# Patient Record
Sex: FEMALE | Race: WHITE | Employment: FULL TIME | URBAN - METROPOLITAN AREA
[De-identification: names, ages, dates, MRNs, and addresses within clinical notes are randomized per-mention and may not be internally consistent; named-entity substitution may affect disease eponyms.]

---

## 2017-05-26 ENCOUNTER — OFFICE VISIT (OUTPATIENT)
Dept: OBGYN CLINIC | Facility: CLINIC | Age: 37
End: 2017-05-26

## 2017-05-26 VITALS
SYSTOLIC BLOOD PRESSURE: 106 MMHG | HEIGHT: 67 IN | BODY MASS INDEX: 20.56 KG/M2 | WEIGHT: 131 LBS | HEART RATE: 69 BPM | DIASTOLIC BLOOD PRESSURE: 69 MMHG

## 2017-05-26 DIAGNOSIS — N94.810 VULVAR VESTIBULITIS: Primary | ICD-10-CM

## 2017-05-26 PROCEDURE — 99203 OFFICE O/P NEW LOW 30 MIN: CPT | Performed by: OBSTETRICS & GYNECOLOGY

## 2017-05-26 RX ORDER — CLOBETASOL PROPIONATE 0.5 MG/G
1 OINTMENT TOPICAL 2 TIMES DAILY
Qty: 30 G | Refills: 1 | Status: SHIPPED | OUTPATIENT
Start: 2017-05-26

## 2017-05-26 NOTE — PATIENT INSTRUCTIONS
Guidelines for Vulvar Skin Care    NOTE: The goal is to promote healthy vulvar skin. This is done by decreasing and removing chemicals, moisture, or rubbing (friction).  Products listed below have been suggested for use because of their past success in help \"mild\". Dove for Sensitive Skin, Neutrogena, Basis,    Aveeno, or Pears are the soaps we suggest. Do not use soap directly on the vulvar skin. Just warm water and your hand will keep the vulvar area clean without irritating the skin.   2. Do These products also help to decrease skin irritation during your period and when you urinate. If wearing wool causes you to itch, do not use A&D ointment, as it contains lanolin. 11. DO NOT DOUCHE.  Baking soda soaks or rinsing with warm water will help ri

## 2017-05-26 NOTE — PROGRESS NOTES
HPI:    Patient ID: Amira Raymundo is a 42year old female. HPI Nulligravida referred by friend of a friend who is a patient. She has menses q 28d / 4d / normal flow and no significant cramps. Condoms for BC.  She is c/o burning on external genitalia ASSESSMENT/PLAN:   Vulvar vestibulitis  (primary encounter diagnosis)  We discussed trial of clobetasol for 2-3 weeks lightly BID. Though no significant DC, we sent culture due to chronicity of problem.      Orders Placed This Encounter  Vaginal Culture

## 2017-06-06 ENCOUNTER — TELEPHONE (OUTPATIENT)
Dept: OBGYN CLINIC | Facility: CLINIC | Age: 37
End: 2017-06-06

## 2017-06-06 NOTE — TELEPHONE ENCOUNTER
Pt states that she used clobetasol for a few days but stopped it last night. Pt states that she feels like the \"skin is shrinking\" and that area has turned purple and very painful.  Pt states that the reaction is only where she used the cream, does not ha

## 2017-06-06 NOTE — TELEPHONE ENCOUNTER
Per the pt she was prescribed a medicated cream, and is having an allergic reaction to it. Please advise.

## 2017-06-08 NOTE — TELEPHONE ENCOUNTER
Pt informed message sent to [facility].  Pt states that she has stopped using the medication Clobetasol. Pt states that the painful feeling from using the medication has stopped. Pt states that the purple area from using the medication has started to fade.   Pt

## 2017-06-09 NOTE — TELEPHONE ENCOUNTER
The pt is calling back again, and is frustrated. The pt would like to speak with someone. Please advise.

## 2017-06-09 NOTE — TELEPHONE ENCOUNTER
Pt calling again requesting recs from [facility] for \"burning in and around the urethra\". Pt denies abnormal discharge. Pt also denies burning and pain with urination.  Pt informed KAVITA is out of the office at this time and nurse will try to get in contact with R

## 2017-06-12 NOTE — TELEPHONE ENCOUNTER
Pt states she has not heard back from clinic. Pt states the pain to the urethra over the weekend was 10/10. Pt reports her pain at 7/10 today. Pt advised to go to the ER for immediate evaluation of pain.  Pt states she does not have insurance so she will no

## 2017-06-13 NOTE — TELEPHONE ENCOUNTER
No answer and detailed message left concerning where we go from here. I understand she is currently uninsured but we are limited to what we can use. An topical anesthetic can have temporary relief of pain but doesn't help inflammation.   The steroid is stil

## 2017-06-16 ENCOUNTER — TELEPHONE (OUTPATIENT)
Dept: OBGYN CLINIC | Facility: CLINIC | Age: 37
End: 2017-06-16

## 2017-06-16 NOTE — TELEPHONE ENCOUNTER
Reviewed with [facility]AVITA in office who stated he will be calling pt later this afternoon. Pt informed and verbalized understanding.

## 2017-06-16 NOTE — TELEPHONE ENCOUNTER
PT CALLING BACK PER PT STATE SHE HAVE NOT GOTTEN A CALL BACK FROM DR WALLS YET / WANT TO MAKE SURE HE DO NOT LEAVE WITHOUT TALKING WITH HER / FOR THE WEEK-END / PLS ADV

## 2017-06-16 NOTE — TELEPHONE ENCOUNTER
PT IS STILL WAITING FOR THE DR TO CALL HER BACK ABOUT WHAT MEDICATION TO TAKE  , THE MEDICATION IS FOR BURNING AND PAIN ,

## 2017-06-24 NOTE — TELEPHONE ENCOUNTER
CALLED PT.  STATES SHE GOT THE MESSAGE LAST NIGHT THAT [facility] WAS GOING TO CALL THIS MORNING AND SHE IS STILL WAITING. SHE SAID SHE IS ONLY AVAILABLE TO TALK UNTIL 10:45AM AS SHE STARTS WORK THEN.   STATES SHE CAN BE REACHED AT [employer] ALL DAY MON AND T

## 2017-06-26 ENCOUNTER — TELEPHONE (OUTPATIENT)
Dept: OBGYN CLINIC | Facility: CLINIC | Age: 37
End: 2017-06-26

## 2017-06-27 NOTE — TELEPHONE ENCOUNTER
Shavon Currie and  Discussed her complaints tonight over the phone. I again reviewed her remote history with 5 months of symptoms several years ago followed by spontaneous resolution which may have been in part due to a course of Pyridium.  I did not have that part

## 2017-06-29 NOTE — TELEPHONE ENCOUNTER
PER DR. RAY'S OFFICE THE FIRST OPENING IS AT THE [place] OFFICE ON 7-5-17 AND NEXT OPENING AT THE [place] OFFICE IS 8-29-17.   SINCE PT IS SELF-PAY SHE WILL NEED TO GIVE $600 DEPOSIT, SIGN A WAIVER RE:RESPONSIBLITY FOR THE CHARGES TO BE PAID WITHIN

## 2020-12-02 ENCOUNTER — OFFICE VISIT (OUTPATIENT)
Dept: INTERNAL MEDICINE CLINIC | Facility: CLINIC | Age: 40
End: 2020-12-02

## 2020-12-02 VITALS
DIASTOLIC BLOOD PRESSURE: 78 MMHG | HEIGHT: 67 IN | HEART RATE: 58 BPM | WEIGHT: 141.38 LBS | SYSTOLIC BLOOD PRESSURE: 110 MMHG | OXYGEN SATURATION: 98 % | BODY MASS INDEX: 22.19 KG/M2

## 2020-12-02 DIAGNOSIS — R11.0 NAUSEA: ICD-10-CM

## 2020-12-02 DIAGNOSIS — R42 DIZZINESS: ICD-10-CM

## 2020-12-02 DIAGNOSIS — R42 VERTIGO: Primary | ICD-10-CM

## 2020-12-02 DIAGNOSIS — D22.9 MULTIPLE PIGMENTED NEVI: ICD-10-CM

## 2020-12-02 DIAGNOSIS — Z80.0 FAMILY HISTORY OF COLON CANCER: ICD-10-CM

## 2020-12-02 DIAGNOSIS — R30.0 BURNING WITH URINATION: ICD-10-CM

## 2020-12-02 PROCEDURE — 3008F BODY MASS INDEX DOCD: CPT | Performed by: INTERNAL MEDICINE

## 2020-12-02 PROCEDURE — 3074F SYST BP LT 130 MM HG: CPT | Performed by: INTERNAL MEDICINE

## 2020-12-02 PROCEDURE — 3078F DIAST BP <80 MM HG: CPT | Performed by: INTERNAL MEDICINE

## 2020-12-02 PROCEDURE — 99204 OFFICE O/P NEW MOD 45 MIN: CPT | Performed by: INTERNAL MEDICINE

## 2020-12-02 RX ORDER — MECLIZINE HYDROCHLORIDE 25 MG/1
25 TABLET ORAL 3 TIMES DAILY PRN
Qty: 30 TABLET | Refills: 0 | Status: SHIPPED | OUTPATIENT
Start: 2020-12-02

## 2020-12-02 NOTE — PROGRESS NOTES
Cynthia Huerta is a 41year old female.     Chief complaint: vertigo and nausea     HPI:     Cynthia Huerta is a 41year old female who presents for vertigo and nausea   Symptoms started 2 months   Got better then 3 weeks ago started happening again well developed, well nourished,in no apparent distress  SKIN: multiple pigmented skin nevi   HEENT: atraumatic, normocephalic  NECK: supple,no adenopathy  LUNGS: clear to auscultation  CARDIO: RRR without murmur  GI: good BS's,no masses, HSM or tenderness vertigo   Starting meclizine   referral to GI since fx of colon cancer   Will have blood work       Advised to schedule annual physical exam and follow up in 1 month   Please return to the clinic if you are having persistent or worsening symptoms   Lore Skinner

## 2020-12-08 ENCOUNTER — TELEPHONE (OUTPATIENT)
Dept: INTERNAL MEDICINE CLINIC | Facility: CLINIC | Age: 40
End: 2020-12-08

## 2020-12-08 NOTE — TELEPHONE ENCOUNTER
Patient checking on her lab results from [facility] that she had done last Thursday.

## 2020-12-11 ENCOUNTER — TELEPHONE (OUTPATIENT)
Dept: INTERNAL MEDICINE CLINIC | Facility: CLINIC | Age: 40
End: 2020-12-11

## 2020-12-11 NOTE — TELEPHONE ENCOUNTER
Spoke to patient     Called [facility] to get results     They had our old office info    Gave them our new address and numbers. Will fax results.

## 2020-12-11 NOTE — TELEPHONE ENCOUNTER
Pt was seen on December 2nd and is still waiting for someone to call her with her results from her labs.

## 2020-12-15 DIAGNOSIS — R10.9 ABDOMINAL PAIN, UNSPECIFIED ABDOMINAL LOCATION: Primary | ICD-10-CM

## (undated) NOTE — MR AVS SNAPSHOT
[place]  [address]  [phone]               Thank you for choosing us for your health care visit with Martín Dawn. DO Nithin.   We are glad to serve you and happy to provide you with this summ allows air in and moisture out. Do not wear underwear when sleeping at night. Loose fitting boxers or pajama bottoms are fine. 2. Avoid pantyhose.  If you must wear them, either cut out the lesly crotch (if you cut   out the crotch, be sure to leave abou urinating. Pat dry rather than wiping. 8. Do not use deodorized pads and tampons. Tampons may be used when the blood flow is heavy enough to soak one tampon in four hours or less.  Tampons are safe for most women, but wearing them too long or when the bloo these oils will rinse away with water and will not increase your chances of infection. Water-based lubricants tend to dry out before intercourse is over and may also contain chemicals that can irritate your vulvar skin.  It may be helpful to use a non-lubri